# Patient Record
(demographics unavailable — no encounter records)

---

## 2025-06-24 NOTE — PLAN
[FreeTextEntry1] : Patient here for a routine annual gynecological follow-up with complaint of amenorrhea and a known early intrauterine gestation. Patient with normal gynecological exam. Pap smear was not done today secondary to recent pap. Will obtain pap smear from [facility]. Breast self-examination instructed. Patient with an early intrauterine gestation. EDC is 1/24/26 based on LMP 4/19/25 and confirmed by today's early ultrasound. Urine culture was sent to the lab. Gonorrhea and Chlamydia cultures sent. Instruction precautions were given. Diet, multivitamin, iron, and folic acid discussed. Prenatal care plan reviewed with patient. Patient for routine blood work. Records reviewed from Dr Nancy Barrera. All questions answered to the patient's satisfaction. Patient to return to office in 1 month for routine obstetrical care.   PHQ-9 questionnaire reviewed with patient. Patient scored 12. Patient speaks to a therapist on a regular basis and does not feel she needs any further intervention at this time Total 5 minutes spent with the patient regarding questionnaire.  I, Alanna Perkins, acted solely as a scribe for Dr. Naren Hamilton on this date 06/24/2025.   All medical record entries made by the Scribe were at my, Dr. Naren Hamilton, direction and personally dictated by me on 06/24/2025. I have reviewed the chart and agree that the record accurately reflects my personal performance of the history, physical exam, assessment and plan. I have also personally directed, reviewed, and agreed with the chart.

## 2025-06-24 NOTE — PLAN
[FreeTextEntry1] : Patient here for a routine annual gynecological follow-up with complaint of amenorrhea and a known early intrauterine gestation. Patient with normal gynecological exam. Pap smear was not done today secondary to recent pap. Will obtain pap smear from [facility]. Breast self-examination instructed. Patient with an early intrauterine gestation. EDC is 1/24/26 based on LMP 4/19/25 and confirmed by today's early ultrasound. Urine culture was sent to the lab. Gonorrhea and Chlamydia cultures sent. Instruction precautions were given. Diet, multivitamin, iron, and folic acid discussed. Prenatal care plan reviewed with patient. Patient for routine blood work. Records reviewed from Dr Nancy Barrera. All questions answered to the patient's satisfaction. Patient to return to office in 1 month for routine obstetrical care.   PHQ-9 questionnaire reviewed with patient. Patient scored 12. Patient speaks to a therapist on a regular basis and does not feel she needs any further intervention at this time Total 5 minutes spent with the patient regarding questionnaire.  I, Alanan Perkins, acted solely as a scribe for Dr. Naren Hamilton on this date 06/24/2025.   All medical record entries made by the Scribe were at my, Dr. Naren Hamilton, direction and personally dictated by me on 06/24/2025. I have reviewed the chart and agree that the record accurately reflects my personal performance of the history, physical exam, assessment and plan. I have also personally directed, reviewed, and agreed with the chart.

## 2025-06-24 NOTE — HISTORY OF PRESENT ILLNESS
[FreeTextEntry1] : Patient is a 22 year-old  with an LMP of 25 here for gynecological follow-up with c/o amenorrhea and a known intrauterine pregnancy. No vomiting since a month ago. Pt feels well. Previously saw [facility], had one blood test and sonogram. [facility] gave 26 EDC with does not match LMP. Pt was bleeding at time of last menses and was recontinuing BC at the time. Pt had her last pap smear at [facility]. Saw MAL Barrera PA _ had bloods/sono  Obstetrical history: None Gynecological history:      12 x 5 x 28, regular, moderate flow, severe cramps      Patient's last pap smear was 1 year ago @ [facility]      Gardasil x 3      Denies STDs, fibroids, cysts      Contraceptive use: OCPs Medical history:       Hx of anxiety or depression, nico, bipolar: seeing psychiatrist, not on medication as pregnant.      No thyroid issues, sister thyroid issues, likely hypothyroid      Nephrolithiasis: Kidney stone of left kidney Surgical history:       Cystoscopy for removal of kidney stones Family history:       Grandmother with diabetes      Breast cancer, maternal aunt in 60s      Unsure of BRACA gene status       Maternal great-uncle  of liver cancer      Siblings high potassium Social history: Denies alcohol, tobacco or drug use    Marrying in 1.5 months    Ashkenazi x 2 Allergies: NKDA Medications: Diclegis and Prenatal vitamins Review of systems: None

## 2025-06-24 NOTE — HISTORY OF PRESENT ILLNESS
[FreeTextEntry1] : Patient is a 22 year-old  with an LMP of 25 here for gynecological follow-up with c/o amenorrhea and a known intrauterine pregnancy. No vomiting since a month ago. Pt feels well. Previously saw [facility], had one blood test and sonogram. [facility] gave 26 EDC with does not match LMP. Pt was bleeding at time of last menses and was recontinuing BC at the time. Pt had her last pap smear at [facility]. Saw MAL Barrera PA _ had bloods/sono  Obstetrical history: None Gynecological history:      12 x 5 x 28, regular, moderate flow, severe cramps      Patient's last pap smear was 1 year ago @ [facility]      Gardasil x 3      Denies STDs, fibroids, cysts      Contraceptive use: OCPs Medical history:       Hx of anxiety or depression, nico, bipolar: seeing psychiatrist, not on medication as pregnant.      No thyroid issues, sister thyroid issues, likely hypothyroid      Nephrolithiasis: Kidney stone of left kidney Surgical history:       Cystoscopy for removal of kidney stones Family history:       Grandmother with diabetes      Breast cancer, maternal aunt in 60s      Unsure of BRACA gene status       Maternal great-uncle  of liver cancer      Siblings high potassium Social history: Denies alcohol, tobacco or drug use    Marrying in 1.5 months    Ashkenazi x 2 Allergies: NKDA Medications: Diclegis and Prenatal vitamins Review of systems: None

## 2025-06-24 NOTE — PHYSICAL EXAM
[Chaperoned Physical Exam] : A chaperone was present in the examining room during all aspects of the physical examination. [MA] : MA [Appropriately responsive] : appropriately responsive [Alert] : alert [No Acute Distress] : no acute distress [No Lymphadenopathy] : no lymphadenopathy [Soft] : soft [Non-tender] : non-tender [Non-distended] : non-distended [No HSM] : No HSM [No Lesions] : no lesions [No Mass] : no mass [Oriented x3] : oriented x3 [Examination Of The Breasts] : a normal appearance [No Masses] : no breast masses were palpable [Labia Majora] : normal [Labia Minora] : normal [Normal] : normal [Uterine Adnexae] : normal [Anteversion] : anteverted [FreeTextEntry2] : TRINY CHATMAN

## 2025-07-03 NOTE — PLAN
[FreeTextEntry1] : Patient is here for gynecological follow-up regarding bleeding prior to menses.  Pap smear was done.  nothing in vag for 2 weeks     Patient to follow-up in 3 weeks.

## 2025-07-03 NOTE — HISTORY OF PRESENT ILLNESS
[FreeTextEntry1] : Patient is a 22-year-old who presents today for a gynecologic follow-up regarding bleeding that isn't near her cycle. It's been occurring for 24 hours. Uses liners for flow. Patient complaints of pain.  Patient is blood type O+    10w and 6 days measured dates 169 fetal heart beat.

## 2025-08-14 NOTE — HISTORY OF PRESENT ILLNESS
[FreeTextEntry1] : Ms Magana is a 22 year old  at 3 months pregnant referred for nephrolithiasis history of bilateral nephrolithiasis s/p right ESWL in 2025 with Dr. Tavon Mcdonnell  Pt had planned left ESWL in  however the procedure was cancelled due to pregnancy. Currently feels well. endorses intermittent left flank pain that is tolerable, as well as nausea which she attributes to her pregnancy. she denies dysuria, hematuria, fevers/ chills, and increased urinary frequency.  she drinks ~two 24 oz stephenson daily, is mostly vegetarian, and eats many foods high in citrate. she is planning to have a scheduled  in January.   [Urinary Incontinence] : no urinary incontinence [Urinary Retention] : no urinary retention [Urinary Urgency] : no urinary urgency [Urinary Frequency] : no urinary frequency [Nocturia] : no nocturia [Straining] : no straining [Weak Stream] : no weak stream [Intermittency] : no intermittency [Post-Void Dribbling] : no post-void dribbling [Dysuria] : no dysuria [Hematuria - Gross] : no gross hematuria [Fever] : no fever

## 2025-08-14 NOTE — ASSESSMENT
[FreeTextEntry1] : Ms Magana is a 22 year old  at 3 months pregnant presenting with left nephrolithiasis  - renal US, follow up pending results - UA and culture - obtain records from Dr. Tavon Mcdonnell - Discussed possible etiologies for nephrolithiasis. Reviewed behavioral modifications including adequate hydration, cutting back on coffee, dark sodas, dark teas, low sodium diet, increasing citrate levels with lemon juice.  - Discussed importance of seeking medical attention should intractable flank pain with nausea, vomiting or fever occur